# Patient Record
(demographics unavailable — no encounter records)

---

## 2024-10-16 NOTE — HISTORY OF PRESENT ILLNESS
[FreeTextEntry1] : 45 YO F PRESENTS FOR ANNUAL VISIT. PT FEELS WELL AND OFFERS NO COMPLAINTS.  MIRENA IUD- DOES NOT GET A PERIOD  LPAP 10/3/23: NORMAL LMAMMO 10/24/23: BIRADS-3. PT DID NOT FOLLOW UP FOR ADDITIONAL IMAGING 6 MONTHS LATER. HX L-BREAST BIOPSY @12:00 WITH CLIP PLACEMENT

## 2024-10-16 NOTE — PHYSICAL EXAM
[Chaperone Declined] : Patient declined chaperone [Appropriately responsive] : appropriately responsive [Alert] : alert [No Acute Distress] : no acute distress [Soft] : soft [Non-tender] : non-tender [Non-distended] : non-distended [No HSM] : No HSM [No Lesions] : no lesions [No Mass] : no mass [Oriented x3] : oriented x3 [Examination Of The Breasts] : a normal appearance [No Masses] : no breast masses were palpable [Labia Majora] : normal [Labia Minora] : normal [Polyp ___ cm] : [unfilled] ~[facility] polyp [Normal] : normal [Uterine Adnexae] : normal [FreeTextEntry5] : POLYP REMOVED WITH PAP BRUSH. SMALL AMOUNT OF BLEEDING.

## 2024-10-16 NOTE — PLAN
[FreeTextEntry1] :  45 YO F PRESENTS FOR ANNUAL EXAMINATION BREAST EXAM WITHIN NORMAL LIMITS AND SELF BREAST EXAM DISCUSSED VAGINAL EXAM: 0.5CM POLYP NOTED IN OS THAT WAS REMOVED WITH PAP BRUSH AND PAP COLLECTED DISCUSSED FOLLOW UP BASED ON PAP RESULT FOLLOW UP IN 1 YEAR OR PRN ALL QUESTIONS ANSWERED